# Patient Record
Sex: MALE | Race: WHITE | Employment: FULL TIME | URBAN - METROPOLITAN AREA
[De-identification: names, ages, dates, MRNs, and addresses within clinical notes are randomized per-mention and may not be internally consistent; named-entity substitution may affect disease eponyms.]

---

## 2019-01-22 ENCOUNTER — HOSPITAL ENCOUNTER (OUTPATIENT)
Dept: BONE DENSITY | Age: 59
Discharge: HOME | End: 2019-01-22
Attending: INTERNAL MEDICINE
Payer: COMMERCIAL

## 2019-01-22 DIAGNOSIS — M81.0 OSTEOPOROSIS: ICD-10-CM

## 2019-01-22 PROCEDURE — 77080 DXA BONE DENSITY AXIAL: CPT

## 2024-02-01 ENCOUNTER — APPOINTMENT (OUTPATIENT)
Dept: URGENT CARE | Age: 64
End: 2024-02-01

## 2024-03-13 DIAGNOSIS — Z00.6 ENCOUNTER FOR EXAMINATION FOR NORMAL COMPARISON OR CONTROL IN CLINICAL RESEARCH PROGRAM: ICD-10-CM

## 2024-05-15 ENCOUNTER — OFFICE VISIT (OUTPATIENT)
Dept: FAMILY MEDICINE CLINIC | Facility: CLINIC | Age: 64
End: 2024-05-15
Payer: COMMERCIAL

## 2024-05-15 VITALS
TEMPERATURE: 97.6 F | OXYGEN SATURATION: 98 % | HEART RATE: 59 BPM | SYSTOLIC BLOOD PRESSURE: 116 MMHG | WEIGHT: 162.2 LBS | RESPIRATION RATE: 16 BRPM | DIASTOLIC BLOOD PRESSURE: 76 MMHG | HEIGHT: 70 IN | BODY MASS INDEX: 23.22 KG/M2

## 2024-05-15 DIAGNOSIS — Z12.5 PROSTATE CANCER SCREENING: ICD-10-CM

## 2024-05-15 DIAGNOSIS — Z00.00 ANNUAL PHYSICAL EXAM: ICD-10-CM

## 2024-05-15 DIAGNOSIS — Z13.220 LIPID SCREENING: ICD-10-CM

## 2024-05-15 DIAGNOSIS — Z11.59 NEED FOR HEPATITIS C SCREENING TEST: ICD-10-CM

## 2024-05-15 DIAGNOSIS — Z13.29 THYROID DISORDER SCREENING: ICD-10-CM

## 2024-05-15 DIAGNOSIS — Z13.1 SCREENING FOR DIABETES MELLITUS: ICD-10-CM

## 2024-05-15 DIAGNOSIS — M81.0 AGE-RELATED OSTEOPOROSIS WITHOUT CURRENT PATHOLOGICAL FRACTURE: ICD-10-CM

## 2024-05-15 DIAGNOSIS — R79.89 LOW TESTOSTERONE IN MALE: ICD-10-CM

## 2024-05-15 DIAGNOSIS — Z76.89 ENCOUNTER TO ESTABLISH CARE WITH NEW DOCTOR: Primary | ICD-10-CM

## 2024-05-15 PROCEDURE — 99204 OFFICE O/P NEW MOD 45 MIN: CPT | Performed by: INTERNAL MEDICINE

## 2024-05-15 RX ORDER — FINASTERIDE 1 MG/1
TABLET, FILM COATED ORAL
COMMUNITY
Start: 2024-04-25

## 2024-05-15 RX ORDER — TESTOSTERONE CYPIONATE 200 MG/ML
INJECTION, SOLUTION INTRAMUSCULAR
COMMUNITY
Start: 2024-03-27

## 2024-05-15 NOTE — PROGRESS NOTES
Assessment/Plan:           Problem List Items Addressed This Visit    None  Visit Diagnoses       Encounter to establish care with new doctor    -  Primary    Age-related osteoporosis without current pathological fracture        Relevant Orders    Vitamin D 25 hydroxy    Low testosterone in male        Relevant Orders    Prolactin    Testosterone, Free and Weakly Bound    Prostate cancer screening        Relevant Orders    PSA, Total Screen    Ambulatory Referral to Urology    Screening for diabetes mellitus        Relevant Orders    CBC and differential    Comprehensive metabolic panel    Urinalysis with microscopic    Thyroid disorder screening        Relevant Orders    TSH, 3rd generation with Free T4 reflex    Lipid screening        Relevant Orders    Lipid panel    Need for hepatitis C screening test                  Subjective:      Patient ID: Branden Connolly is a 63 y.o. male.    HPI  Pleasant 63-year-old gentleman who is here to establish care referred by his wife who recently moved from [place].  His past medical history significant for osteoporosis when he broke her collarbone was done 9 2 and being on Forteo and then Fosamax and currently is on Reclast once a year.  He will be due for his next injection.  He has an appointment to see endocrinology in August.  He will be getting his DEXA scan this year.  While he was going for workup of osteoporosis it was noted to have low testosterone levels as well.  Was placed on Depo testosterone injection once a week.  PSA will be ordered patient will be referred to urology.  There is family history of breast cancer in her mother and her sister.  He is not aware if BRCA gene was found.  He is interested in helix testing.  .  He recalls and shares an episode of orchitis when he was young.  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  Reviewing records available in the chart patient had tetanus vaccination 2017.   He is  "up-to-date with shingles vaccination.   Did not get pneumonia vaccination.  Patient had colonoscopy 2021 and was asked to come back in 10 years    The following portions of the patient's history were reviewed and updated as appropriate: allergies, current medications, past family history, past medical history, past social history, past surgical history, and problem list.    Review of Systems      Objective:      /76 (BP Location: Left arm, Patient Position: Sitting, Cuff Size: Standard)   Pulse 59   Temp 97.6 °F (36.4 °C) (Tympanic)   Resp 16   Ht 5' 10\" (1.778 m)   Wt 73.6 kg (162 lb 3.2 oz)   SpO2 98%   BMI 23.27 kg/m²          Physical Exam  Neck:      Vascular: No carotid bruit.   Cardiovascular:      Rate and Rhythm: Normal rate and regular rhythm.      Pulses: Normal pulses.      Heart sounds: Normal heart sounds. No murmur heard.  Pulmonary:      Effort: Pulmonary effort is normal. No respiratory distress.      Breath sounds: Normal breath sounds. No wheezing or rales.   Abdominal:      General: Bowel sounds are normal. There is no distension.      Tenderness: There is no abdominal tenderness. There is no guarding.   Musculoskeletal:      Right lower leg: No edema.      Left lower leg: No edema.   Lymphadenopathy:      Cervical: No cervical adenopathy.      Upper Body:      Right upper body: No supraclavicular or axillary adenopathy.      Left upper body: No supraclavicular or axillary adenopathy.   Neurological:      Mental Status: He is alert.   Psychiatric:         Mood and Affect: Mood normal.         Behavior: Behavior normal.               Depression Screening and Follow-up Plan: Patient was screened for depression during today's encounter. They screened negative with a PHQ-2 score of 0.       "

## 2024-05-18 ENCOUNTER — APPOINTMENT (OUTPATIENT)
Dept: LAB | Age: 64
End: 2024-05-18
Payer: COMMERCIAL

## 2024-05-18 ENCOUNTER — LAB (OUTPATIENT)
Dept: LAB | Age: 64
End: 2024-05-18
Payer: COMMERCIAL

## 2024-05-18 DIAGNOSIS — Z00.6 ENCOUNTER FOR EXAMINATION FOR NORMAL COMPARISON OR CONTROL IN CLINICAL RESEARCH PROGRAM: ICD-10-CM

## 2024-05-18 DIAGNOSIS — Z13.1 SCREENING FOR DIABETES MELLITUS: ICD-10-CM

## 2024-05-18 DIAGNOSIS — Z00.8 HEALTH EXAMINATION IN POPULATION SURVEY: ICD-10-CM

## 2024-05-18 DIAGNOSIS — R79.89 LOW TESTOSTERONE IN MALE: ICD-10-CM

## 2024-05-18 LAB
25(OH)D3 SERPL-MCNC: 47.8 NG/ML (ref 30–100)
ALBUMIN SERPL BCP-MCNC: 4.4 G/DL (ref 3.5–5)
ALP SERPL-CCNC: 32 U/L (ref 34–104)
ALT SERPL W P-5'-P-CCNC: 18 U/L (ref 7–52)
ANION GAP SERPL CALCULATED.3IONS-SCNC: 8 MMOL/L (ref 4–13)
AST SERPL W P-5'-P-CCNC: 20 U/L (ref 13–39)
BACTERIA UR QL AUTO: NORMAL /HPF
BASOPHILS # BLD AUTO: 0.03 THOUSANDS/ÂΜL (ref 0–0.1)
BASOPHILS NFR BLD AUTO: 1 % (ref 0–1)
BILIRUB SERPL-MCNC: 1.67 MG/DL (ref 0.2–1)
BILIRUB UR QL STRIP: NEGATIVE
BUN SERPL-MCNC: 15 MG/DL (ref 5–25)
CALCIUM SERPL-MCNC: 8.8 MG/DL (ref 8.4–10.2)
CHLORIDE SERPL-SCNC: 105 MMOL/L (ref 96–108)
CHOLEST SERPL-MCNC: 177 MG/DL
CLARITY UR: CLEAR
CO2 SERPL-SCNC: 28 MMOL/L (ref 21–32)
COLOR UR: COLORLESS
CREAT SERPL-MCNC: 0.95 MG/DL (ref 0.6–1.3)
EOSINOPHIL # BLD AUTO: 0.09 THOUSAND/ÂΜL (ref 0–0.61)
EOSINOPHIL NFR BLD AUTO: 2 % (ref 0–6)
ERYTHROCYTE [DISTWIDTH] IN BLOOD BY AUTOMATED COUNT: 12.7 % (ref 11.6–15.1)
EST. AVERAGE GLUCOSE BLD GHB EST-MCNC: 111 MG/DL
GFR SERPL CREATININE-BSD FRML MDRD: 84 ML/MIN/1.73SQ M
GLUCOSE P FAST SERPL-MCNC: 89 MG/DL (ref 65–99)
GLUCOSE UR STRIP-MCNC: NEGATIVE MG/DL
HBA1C MFR BLD: 5.5 %
HCT VFR BLD AUTO: 46.6 % (ref 36.5–49.3)
HDLC SERPL-MCNC: 62 MG/DL
HGB BLD-MCNC: 15.2 G/DL (ref 12–17)
HGB UR QL STRIP.AUTO: NEGATIVE
IMM GRANULOCYTES # BLD AUTO: 0 THOUSAND/UL (ref 0–0.2)
IMM GRANULOCYTES NFR BLD AUTO: 0 % (ref 0–2)
KETONES UR STRIP-MCNC: NEGATIVE MG/DL
LDLC SERPL CALC-MCNC: 103 MG/DL (ref 0–100)
LEUKOCYTE ESTERASE UR QL STRIP: NEGATIVE
LYMPHOCYTES # BLD AUTO: 1.55 THOUSANDS/ÂΜL (ref 0.6–4.47)
LYMPHOCYTES NFR BLD AUTO: 36 % (ref 14–44)
MCH RBC QN AUTO: 30.6 PG (ref 26.8–34.3)
MCHC RBC AUTO-ENTMCNC: 32.6 G/DL (ref 31.4–37.4)
MCV RBC AUTO: 94 FL (ref 82–98)
MONOCYTES # BLD AUTO: 0.34 THOUSAND/ÂΜL (ref 0.17–1.22)
MONOCYTES NFR BLD AUTO: 8 % (ref 4–12)
NEUTROPHILS # BLD AUTO: 2.33 THOUSANDS/ÂΜL (ref 1.85–7.62)
NEUTS SEG NFR BLD AUTO: 53 % (ref 43–75)
NITRITE UR QL STRIP: NEGATIVE
NON-SQ EPI CELLS URNS QL MICRO: NORMAL /HPF
NONHDLC SERPL-MCNC: 115 MG/DL
NRBC BLD AUTO-RTO: 0 /100 WBCS
PH UR STRIP.AUTO: 6.5 [PH]
PLATELET # BLD AUTO: 222 THOUSANDS/UL (ref 149–390)
PMV BLD AUTO: 10.2 FL (ref 8.9–12.7)
POTASSIUM SERPL-SCNC: 3.9 MMOL/L (ref 3.5–5.3)
PROLACTIN SERPL-MCNC: 9.73 NG/ML
PROT SERPL-MCNC: 6.8 G/DL (ref 6.4–8.4)
PROT UR STRIP-MCNC: NEGATIVE MG/DL
PSA SERPL-MCNC: 0.33 NG/ML (ref 0–4)
RBC # BLD AUTO: 4.96 MILLION/UL (ref 3.88–5.62)
RBC #/AREA URNS AUTO: NORMAL /HPF
SODIUM SERPL-SCNC: 141 MMOL/L (ref 135–147)
SP GR UR STRIP.AUTO: 1.01 (ref 1–1.03)
TRIGL SERPL-MCNC: 60 MG/DL
TSH SERPL DL<=0.05 MIU/L-ACNC: 2.61 UIU/ML (ref 0.45–4.5)
UROBILINOGEN UR STRIP-ACNC: <2 MG/DL
WBC # BLD AUTO: 4.34 THOUSAND/UL (ref 4.31–10.16)
WBC #/AREA URNS AUTO: NORMAL /HPF

## 2024-05-18 PROCEDURE — 84410 TESTOSTERONE BIOAVAILABLE: CPT | Performed by: INTERNAL MEDICINE

## 2024-05-18 PROCEDURE — G0103 PSA SCREENING: HCPCS | Performed by: INTERNAL MEDICINE

## 2024-05-18 PROCEDURE — 80053 COMPREHEN METABOLIC PANEL: CPT | Performed by: INTERNAL MEDICINE

## 2024-05-18 PROCEDURE — 84146 ASSAY OF PROLACTIN: CPT

## 2024-05-18 PROCEDURE — 85025 COMPLETE CBC W/AUTO DIFF WBC: CPT | Performed by: INTERNAL MEDICINE

## 2024-05-18 PROCEDURE — 83036 HEMOGLOBIN GLYCOSYLATED A1C: CPT

## 2024-05-18 PROCEDURE — 36415 COLL VENOUS BLD VENIPUNCTURE: CPT

## 2024-05-18 PROCEDURE — 81001 URINALYSIS AUTO W/SCOPE: CPT

## 2024-05-18 PROCEDURE — 36415 COLL VENOUS BLD VENIPUNCTURE: CPT | Performed by: INTERNAL MEDICINE

## 2024-05-18 PROCEDURE — 80061 LIPID PANEL: CPT | Performed by: INTERNAL MEDICINE

## 2024-05-18 PROCEDURE — 84443 ASSAY THYROID STIM HORMONE: CPT | Performed by: INTERNAL MEDICINE

## 2024-05-18 PROCEDURE — 82306 VITAMIN D 25 HYDROXY: CPT | Performed by: INTERNAL MEDICINE

## 2024-05-21 LAB
DEPRECATED TESTOST FREE FR SERPL: 96 NG/DL (ref 40–250)
TESTOST SERPL-MCNC: 1091 NG/DL (ref 264–916)
TESTOSTERONE.FREE+WB MFR SERPL: 8.8 % (ref 9–46)

## 2024-05-23 ENCOUNTER — TELEPHONE (OUTPATIENT)
Dept: FAMILY MEDICINE CLINIC | Facility: CLINIC | Age: 64
End: 2024-05-23

## 2024-05-23 NOTE — TELEPHONE ENCOUNTER
----- Message from Sita Rivera MD sent at 5/22/2024  4:29 PM EDT -----  Patient on testosterone supplementation.  He can discuss this further with his urologist

## 2024-06-11 ENCOUNTER — TELEPHONE (OUTPATIENT)
Dept: FAMILY MEDICINE CLINIC | Facility: CLINIC | Age: 64
End: 2024-06-11

## 2024-07-10 LAB
APOB+LDLR+PCSK9 GENE MUT ANL BLD/T: NOT DETECTED
BRCA1+BRCA2 DEL+DUP + FULL MUT ANL BLD/T: NOT DETECTED
MLH1+MSH2+MSH6+PMS2 GN DEL+DUP+FUL M: NOT DETECTED

## 2024-07-26 ENCOUNTER — TELEPHONE (OUTPATIENT)
Dept: ENDOCRINOLOGY | Facility: CLINIC | Age: 64
End: 2024-07-26

## 2024-07-26 ENCOUNTER — TELEPHONE (OUTPATIENT)
Age: 64
End: 2024-07-26

## 2024-07-26 NOTE — TELEPHONE ENCOUNTER
Patient is requesting an referral for the following specialty:      Test Name / Order Name: endocrinologist    DX Code: M81.0    Date Of Service: 8/6/2024    Location/Facility Name/Address/Phone #:     Location / Facility NPI: [id number]    Best Phone # To Reach The Patient:

## 2024-07-26 NOTE — TELEPHONE ENCOUNTER
Called Dr Sita Rivera's office and spoke to Argelia that Pt will need a refererral for aug 6th office visit and Argelia said she will send to the referral team

## 2024-08-01 NOTE — TELEPHONE ENCOUNTER
Pt called unsure why Dr. Rivera needed to do a referral.  He had scheduled the appt before starting with Dr. Rivera as recently moved to the area from [place].    While in [place] he was seeing Dr. Mabel Flores MD - endocrinologist.  Dr phone [phone]     He had schedule with endocrinology appt to get get establish and did not know he needed a referral from Dr. Rivera.    If any further information is needed please contact pt [phone]    Pt does have a f/u appt scheduled for September.

## 2024-08-02 ENCOUNTER — TELEPHONE (OUTPATIENT)
Dept: UROLOGY | Facility: AMBULATORY SURGERY CENTER | Age: 64
End: 2024-08-02

## 2024-08-02 DIAGNOSIS — R79.89 LOW TESTOSTERONE IN MALE: Primary | ICD-10-CM

## 2024-08-02 DIAGNOSIS — M81.0 OSTEOPOROSIS, UNSPECIFIED OSTEOPOROSIS TYPE, UNSPECIFIED PATHOLOGICAL FRACTURE PRESENCE: ICD-10-CM

## 2024-08-02 NOTE — TELEPHONE ENCOUNTER
Called pt and left message for him to call our office giving a reason why he needs a endo referral

## 2024-08-02 NOTE — TELEPHONE ENCOUNTER
LVM to let PT know that his apt with Dr. Vaughn on 8/16 needs to be rescheduled.    Please reschedule NP apt. With an AP in the [place] office.
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
## 2024-08-06 ENCOUNTER — TELEPHONE (OUTPATIENT)
Dept: ENDOCRINOLOGY | Facility: CLINIC | Age: 64
End: 2024-08-06

## 2024-08-06 ENCOUNTER — OFFICE VISIT (OUTPATIENT)
Dept: ENDOCRINOLOGY | Facility: CLINIC | Age: 64
End: 2024-08-06
Payer: COMMERCIAL

## 2024-08-06 VITALS
HEART RATE: 65 BPM | BODY MASS INDEX: 22.85 KG/M2 | HEIGHT: 70 IN | SYSTOLIC BLOOD PRESSURE: 120 MMHG | DIASTOLIC BLOOD PRESSURE: 64 MMHG | WEIGHT: 159.6 LBS | OXYGEN SATURATION: 96 %

## 2024-08-06 DIAGNOSIS — M81.0 OSTEOPOROSIS, UNSPECIFIED OSTEOPOROSIS TYPE, UNSPECIFIED PATHOLOGICAL FRACTURE PRESENCE: Primary | ICD-10-CM

## 2024-08-06 DIAGNOSIS — E29.1 HYPOGONADISM, MALE: ICD-10-CM

## 2024-08-06 DIAGNOSIS — R79.89 LOW TESTOSTERONE IN MALE: ICD-10-CM

## 2024-08-06 PROCEDURE — 99244 OFF/OP CNSLTJ NEW/EST MOD 40: CPT | Performed by: INTERNAL MEDICINE

## 2024-08-06 RX ORDER — TESTOSTERONE CYPIONATE 200 MG/ML
INJECTION, SOLUTION INTRAMUSCULAR
Qty: 10 ML | Refills: 1 | Status: SHIPPED | OUTPATIENT
Start: 2024-08-06

## 2024-08-06 RX ORDER — TESTOSTERONE CYPIONATE 200 MG/ML
INJECTION, SOLUTION INTRAMUSCULAR
Start: 2024-08-06 | End: 2024-08-06 | Stop reason: SDUPTHER

## 2024-08-06 RX ORDER — NEEDLES, SAFETY 18GX1 1/2"
NEEDLE, DISPOSABLE MISCELLANEOUS
COMMUNITY
Start: 2024-08-02

## 2024-08-06 NOTE — PATIENT INSTRUCTIONS
We need records from Dr. Flores!     Get labs, on day 3 after your injection    -if Saturday , do Wednesday     Get DXA

## 2024-08-06 NOTE — ASSESSMENT & PLAN NOTE
Patient was diagnosed and worked up with his last endocrinologist in [place]   Patient did ask for records but we have not received, history is limited to patient recall  Have had patient fill out official record request form  Overall, it does appear that he was osteoporotic on diagnosis 2016, treated with 1.5y Forteo with improvement to osteopenia, followed by almost 2 years of Fosamax and 1 Reclast infusion.     Have asked patient to update DXA scan  Will obtain labs now including CTX, NTX, iron panel, PTH, calcium, albumin, phosporus, B6, as well as screen for celiac and Multiple Myeloma   Pending above results, would consider resuming Reclast vs bisphosphonate holiday

## 2024-08-06 NOTE — ASSESSMENT & PLAN NOTE
Again, we require records from his past endocrinologist   Appears that there may have been a childhood orchitis, followed by infertility and low sperm counts  Appears he was primarily diagnosed with high SHBG and low free testosterone, and has not had many traditional hypogonadal symptoms    Will continue current testosterone regimen of 80mg weekly (0.4mL of 200mg/mL) for now  Will obtain testosterone levels on post injection day 3, as well as LH, FSH, TSH, fT4, SHBG

## 2024-08-07 ENCOUNTER — TELEPHONE (OUTPATIENT)
Age: 64
End: 2024-08-07

## 2024-08-07 NOTE — TELEPHONE ENCOUNTER
PA for testosterone cypionate (DEPO-TESTOSTERONE) 200 mg/mL SOLN  Approved     Date(s) approved approval will  2025     Case #     Patient advised by          [x] [employer] Message  [] Phone call   []LMOM  []L/M to call office as no active Communication consent on file  []Unable to leave detailed message as VM not approved on Communication consent       Pharmacy advised by    [x]Fax  []Phone call    Approval letter scanned into Media Yes

## 2024-08-08 ENCOUNTER — PATIENT MESSAGE (OUTPATIENT)
Dept: ENDOCRINOLOGY | Facility: CLINIC | Age: 64
End: 2024-08-08

## 2024-08-13 ENCOUNTER — APPOINTMENT (OUTPATIENT)
Dept: LAB | Age: 64
End: 2024-08-13
Payer: COMMERCIAL

## 2024-08-13 DIAGNOSIS — R79.89 LOW TESTOSTERONE IN MALE: ICD-10-CM

## 2024-08-13 DIAGNOSIS — M81.0 OSTEOPOROSIS, UNSPECIFIED OSTEOPOROSIS TYPE, UNSPECIFIED PATHOLOGICAL FRACTURE PRESENCE: ICD-10-CM

## 2024-08-13 LAB
ALBUMIN SERPL BCG-MCNC: 4.6 G/DL (ref 3.5–5)
CALCIUM SERPL-MCNC: 9.8 MG/DL (ref 8.4–10.2)
FERRITIN SERPL-MCNC: 56 NG/ML (ref 24–336)
FSH SERPL-ACNC: 0.2 MIU/ML
IGA SERPL-MCNC: 104 MG/DL (ref 66–433)
IRON SATN MFR SERPL: 14 % (ref 15–50)
IRON SERPL-MCNC: 50 UG/DL (ref 50–212)
LH SERPL-ACNC: <0.2 MIU/ML
PHOSPHATE SERPL-MCNC: 3.7 MG/DL (ref 2.3–4.1)
PTH-INTACT SERPL-MCNC: 40.6 PG/ML (ref 12–88)
T4 FREE SERPL-MCNC: 0.76 NG/DL (ref 0.61–1.12)
TIBC SERPL-MCNC: 368 UG/DL (ref 250–450)
TSH SERPL DL<=0.05 MIU/L-ACNC: 1.43 UIU/ML (ref 0.45–4.5)
UIBC SERPL-MCNC: 318 UG/DL (ref 155–355)

## 2024-08-13 PROCEDURE — 84100 ASSAY OF PHOSPHORUS: CPT

## 2024-08-13 PROCEDURE — 84443 ASSAY THYROID STIM HORMONE: CPT

## 2024-08-13 PROCEDURE — 86364 TISS TRNSGLTMNASE EA IG CLAS: CPT

## 2024-08-13 PROCEDURE — 84270 ASSAY OF SEX HORMONE GLOBUL: CPT

## 2024-08-13 PROCEDURE — 36415 COLL VENOUS BLD VENIPUNCTURE: CPT

## 2024-08-13 PROCEDURE — 83970 ASSAY OF PARATHORMONE: CPT

## 2024-08-13 PROCEDURE — 83001 ASSAY OF GONADOTROPIN (FSH): CPT

## 2024-08-13 PROCEDURE — 83550 IRON BINDING TEST: CPT

## 2024-08-13 PROCEDURE — 83002 ASSAY OF GONADOTROPIN (LH): CPT

## 2024-08-13 PROCEDURE — 82523 COLLAGEN CROSSLINKS: CPT

## 2024-08-13 PROCEDURE — 82040 ASSAY OF SERUM ALBUMIN: CPT

## 2024-08-13 PROCEDURE — 82728 ASSAY OF FERRITIN: CPT

## 2024-08-13 PROCEDURE — 86258 DGP ANTIBODY EACH IG CLASS: CPT

## 2024-08-13 PROCEDURE — 84439 ASSAY OF FREE THYROXINE: CPT

## 2024-08-13 PROCEDURE — 84166 PROTEIN E-PHORESIS/URINE/CSF: CPT

## 2024-08-13 PROCEDURE — 84402 ASSAY OF FREE TESTOSTERONE: CPT

## 2024-08-13 PROCEDURE — 84207 ASSAY OF VITAMIN B-6: CPT

## 2024-08-13 PROCEDURE — 82784 ASSAY IGA/IGD/IGG/IGM EACH: CPT

## 2024-08-13 PROCEDURE — 84165 PROTEIN E-PHORESIS SERUM: CPT

## 2024-08-13 PROCEDURE — 83540 ASSAY OF IRON: CPT

## 2024-08-14 LAB
GLIADIN PEPTIDE IGA SER-ACNC: <0.2 U/ML
GLIADIN PEPTIDE IGA SER-ACNC: NEGATIVE
GLIADIN PEPTIDE IGG SER-ACNC: <0.4 U/ML
GLIADIN PEPTIDE IGG SER-ACNC: NEGATIVE
TTG IGA SER-ACNC: <0.5 U/ML
TTG IGA SER-ACNC: NEGATIVE
TTG IGG SER-ACNC: <0.8 U/ML
TTG IGG SER-ACNC: NEGATIVE

## 2024-08-15 LAB
ALBUMIN SERPL ELPH-MCNC: 4.5 G/DL (ref 3.2–5.1)
ALBUMIN SERPL ELPH-MCNC: 64.3 % (ref 48–70)
ALPHA1 GLOB SERPL ELPH-MCNC: 0.23 G/DL (ref 0.15–0.47)
ALPHA1 GLOB SERPL ELPH-MCNC: 3.3 % (ref 1.8–7)
ALPHA2 GLOB SERPL ELPH-MCNC: 0.57 G/DL (ref 0.42–1.04)
ALPHA2 GLOB SERPL ELPH-MCNC: 8.1 % (ref 5.9–14.9)
BETA GLOB ABNORMAL SERPL ELPH-MCNC: 0.39 G/DL (ref 0.31–0.57)
BETA1 GLOB SERPL ELPH-MCNC: 5.5 % (ref 4.7–7.7)
BETA2 GLOB SERPL ELPH-MCNC: 4 % (ref 3.1–7.9)
BETA2+GAMMA GLOB SERPL ELPH-MCNC: 0.28 G/DL (ref 0.2–0.58)
GAMMA GLOB ABNORMAL SERPL ELPH-MCNC: 1.04 G/DL (ref 0.4–1.66)
GAMMA GLOB SERPL ELPH-MCNC: 14.8 % (ref 6.9–22.3)
IGG/ALB SER: 1.8 {RATIO} (ref 1.1–1.8)
PROT PATTERN SERPL ELPH-IMP: NORMAL
PROT SERPL-MCNC: 7 G/DL (ref 6.4–8.2)
SHBG SERPL-SCNC: 87.6 NMOL/L (ref 19.3–76.4)

## 2024-08-15 PROCEDURE — 84165 PROTEIN E-PHORESIS SERUM: CPT | Performed by: STUDENT IN AN ORGANIZED HEALTH CARE EDUCATION/TRAINING PROGRAM

## 2024-08-16 LAB
ALBUMIN UR ELPH-MCNC: 16.3 %
ALPHA1 GLOB MFR UR ELPH: 9.6 %
ALPHA2 GLOB MFR UR ELPH: 5.1 %
B-GLOBULIN MFR UR ELPH: 4.7 %
GAMMA GLOB MFR UR ELPH: 64.3 %
PROT PATTERN UR ELPH-IMP: NORMAL
PROT UR-MCNC: 4 MG/DL

## 2024-08-16 PROCEDURE — 84165 PROTEIN E-PHORESIS SERUM: CPT | Performed by: STUDENT IN AN ORGANIZED HEALTH CARE EDUCATION/TRAINING PROGRAM

## 2024-08-19 ENCOUNTER — APPOINTMENT (OUTPATIENT)
Dept: LAB | Age: 64
End: 2024-08-19
Payer: COMMERCIAL

## 2024-08-19 PROCEDURE — 82523 COLLAGEN CROSSLINKS: CPT

## 2024-08-19 PROCEDURE — 82570 ASSAY OF URINE CREATININE: CPT

## 2024-08-22 LAB
COLLAGEN NTX UR-SCNC: 44 NMOL BCE
COLLAGEN NTX/CREAT UR-SRTO: 10 NM BCE/MM CR (ref 0–62)
CREAT UR-MCNC: 48 MG/DL
INTERPRETIVE GUIDE:: NORMAL
MISCELLANEOUS LAB TEST RESULT: NORMAL
VIT B6 SERPL-MCNC: 49.5 UG/L (ref 3.4–65.2)

## 2024-08-23 LAB — COLLAGEN CTX SERPL-MCNC: 141 PG/ML

## 2024-09-27 ENCOUNTER — OFFICE VISIT (OUTPATIENT)
Age: 64
End: 2024-09-27
Payer: COMMERCIAL

## 2024-09-27 VITALS
DIASTOLIC BLOOD PRESSURE: 70 MMHG | SYSTOLIC BLOOD PRESSURE: 106 MMHG | HEART RATE: 60 BPM | BODY MASS INDEX: 23.34 KG/M2 | WEIGHT: 163 LBS | HEIGHT: 70 IN | OXYGEN SATURATION: 98 % | TEMPERATURE: 98.3 F

## 2024-09-27 DIAGNOSIS — Z00.00 ANNUAL PHYSICAL EXAM: Primary | ICD-10-CM

## 2024-09-27 DIAGNOSIS — Z13.29 THYROID DISORDER SCREENING: ICD-10-CM

## 2024-09-27 DIAGNOSIS — Z13.220 LIPID SCREENING: ICD-10-CM

## 2024-09-27 DIAGNOSIS — Z13.1 SCREENING FOR DIABETES MELLITUS: ICD-10-CM

## 2024-09-27 PROCEDURE — 99396 PREV VISIT EST AGE 40-64: CPT | Performed by: INTERNAL MEDICINE

## 2024-09-27 RX ORDER — NEEDLES, DISPOSABLE 25GX5/8"
NEEDLE, DISPOSABLE MISCELLANEOUS
COMMUNITY
Start: 2024-08-06

## 2024-09-27 NOTE — PATIENT INSTRUCTIONS
"Patient Education     Routine physical for adults   The Basics   Written by the doctors and editors at [facility]   What is a physical? -- A physical is a routine visit, or \"check-up,\" with your doctor. You might also hear it called a \"wellness visit\" or \"preventive visit.\"  During each visit, the doctor will:   Ask about your physical and mental health   Ask about your habits, behaviors, and lifestyle   Do an exam   Give you vaccines if needed   Talk to you about any medicines you take   Give advice about your health   Answer your questions  Getting regular check-ups is an important part of taking care of your health. It can help your doctor find and treat any problems you have. But it's also important for preventing health problems.  A routine physical is different from a \"sick visit.\" A sick visit is when you see a doctor because of a health concern or problem. Since physicals are scheduled ahead of time, you can think about what you want to ask the doctor.  How often should I get a physical? -- It depends on your age and health. In general, for people age 21 years and older:   If you are younger than 50 years, you might be able to get a physical every 3 years.   If you are 50 years or older, your doctor might recommend a physical every year.  If you have an ongoing health condition, like diabetes or high blood pressure, your doctor will probably want to see you more often.  What happens during a physical? -- In general, each visit will include:   Physical exam - The doctor or nurse will check your height, weight, heart rate, and blood pressure. They will also look at your eyes and ears. They will ask about how you are feeling and whether you have any symptoms that bother you.   Medicines - It's a good idea to bring a list of all the medicines you take to each doctor visit. Your doctor will talk to you about your medicines and answer any questions. Tell them if you are having any side effects that bother you. You " "should also tell them if you are having trouble paying for any of your medicines.   Habits and behaviors - This includes:   Your diet   Your exercise habits   Whether you smoke, drink alcohol, or use drugs   Whether you are sexually active   Whether you feel safe at home  Your doctor will talk to you about things you can do to improve your health and lower your risk of health problems. They will also offer help and support. For example, if you want to quit smoking, they can give you advice and might prescribe medicines. If you want to improve your diet or get more physical activity, they can help you with this, too.   Lab tests, if needed - The tests you get will depend on your age and situation. For example, your doctor might want to check your:   Cholesterol   Blood sugar   Iron level   Vaccines - The recommended vaccines will depend on your age, health, and what vaccines you already had. Vaccines are very important because they can prevent certain serious or deadly infections.   Discussion of screening - \"Screening\" means checking for diseases or other health problems before they cause symptoms. Your doctor can recommend screening based on your age, risk, and preferences. This might include tests to check for:   Cancer, such as breast, prostate, cervical, ovarian, colorectal, prostate, lung, or skin cancer   Sexually transmitted infections, such as chlamydia and gonorrhea   Mental health conditions like depression and anxiety  Your doctor will talk to you about the different types of screening tests. They can help you decide which screenings to have. They can also explain what the results might mean.   Answering questions - The physical is a good time to ask the doctor or nurse questions about your health. If needed, they can refer you to other doctors or specialists, too.  Adults older than 65 years often need other care, too. As you get older, your doctor will talk to you about:   How to prevent falling at " home   Hearing or vision tests   Memory testing   How to take your medicines safely   Making sure that you have the help and support you need at home  All topics are updated as new evidence becomes available and our peer review process is complete.  This topic retrieved from [employer] on: May 02, 2024.  Topic 761826 Version 1.0  Release: 32.4.3 - C32.122  © 2024 UpToDate, Inc. and/or its affiliates. All rights reserved.  Consumer Information Use and Disclaimer   Disclaimer: This generalized information is a limited summary of diagnosis, treatment, and/or medication information. It is not meant to be comprehensive and should be used as a tool to help the user understand and/or assess potential diagnostic and treatment options. It does NOT include all information about conditions, treatments, medications, side effects, or risks that may apply to a specific patient. It is not intended to be medical advice or a substitute for the medical advice, diagnosis, or treatment of a health care provider based on the health care provider's examination and assessment of a patient's specific and unique circumstances. Patients must speak with a health care provider for complete information about their health, medical questions, and treatment options, including any risks or benefits regarding use of medications. This information does not endorse any treatments or medications as safe, effective, or approved for treating a specific patient. UpToDate, Inc. and its affiliates disclaim any warranty or liability relating to this information or the use thereof.The use of this information is governed by the Terms of Use, available at https://www.woltersRendeevoouwer.com/en/know/clinical-effectiveness-terms. 2024© UpToDate, Inc. and its affiliates and/or licensors. All rights reserved.  Copyright   © 2024 UpToDate, Inc. and/or its affiliates. All rights reserved.

## 2024-09-27 NOTE — PROGRESS NOTES
Adult Annual Physical  Name: Branden Connolly      : 1960      MRN: [id number]  Encounter Provider: Sita Rivera MD  Encounter Date: 2024   Encounter department: [facility]    Assessment & Plan  Annual physical exam         Screening for diabetes mellitus    Orders:    CBC and differential    Comprehensive metabolic panel    Hemoglobin A1C    Thyroid disorder screening    Orders:    TSH, 3rd generation with Free T4 reflex    Lipid screening    Orders:    Lipid panel      Immunizations and preventive care screenings were discussed with patient today. Appropriate education was printed on patient's after visit summary.        Counseling:  See below  Patient is here for annual physical.  Blood pressure is good today.  He is under care of endocrinology for hypogonadism and osteoporosis.  He has been on testosterone injections 80 mg once a week.  He will be following up with urology for prostate cancer screening.  His last lab studies were reviewed.  Follow-up blood work in May.  Colonoscopy  was asked to come back in 10 years.  Vaccinations discussed.  Patient will get Prevnar 20 in December when he turns 64.  Encouraged him to get flu COVID vaccination.    Depression Screening and Follow-up Plan: Patient was screened for depression during today's encounter. They screened negative with a PHQ-2 score of 0.        History of Present Illness     Adult Annual Physical:  Patient presents for annual physical.     Diet and Physical Activity:  - Diet/Nutrition: well balanced diet.  - Exercise: moderate cardiovascular exercise. Bikes regularly    Depression Screening:  - PHQ-2 Score: 0    General Health:  - Sleep:. Interrupted sleep.  Encouraged her to use melatonin over-the-counter  - Hearing: normal hearing bilateral ears.  - Vision: no vision problems.  - Dental: brushes teeth twice daily.    Advanced Care Planning:  - Has an advanced directive?: no    - Has a durable medical POA?: yes    -  "ACP document given to patient?: no      Review of Systems  Past Medical History   Past Medical History:   Diagnosis Date    Osteoporosis      Past Surgical History:   Procedure Laterality Date    APPENDECTOMY      TONSILLECTOMY  1968     Family History   Problem Relation Age of Onset    Breast cancer Mother     Dementia Father     Diabetes Sister     Breast cancer Sister      Current Outpatient Medications on File Prior to Visit   Medication Sig Dispense Refill    B-D HYPODERMIC NEEDLE 18GX1.5\" 18G X 1-1/2\" MISC       BD Eclipse Syringe 25G X 1\" 3 ML MISC       finasteride (PROPECIA) 1 MG tablet       testosterone cypionate (DEPO-TESTOSTERONE) 200 mg/mL SOLN 0.4 ml once weekly 10 mL 1     No current facility-administered medications on file prior to visit.     Allergies   Allergen Reactions    Naproxen Swelling    Penicillins Rash      Current Outpatient Medications on File Prior to Visit   Medication Sig Dispense Refill    B-D HYPODERMIC NEEDLE 18GX1.5\" 18G X 1-1/2\" MISC       BD Eclipse Syringe 25G X 1\" 3 ML MISC       finasteride (PROPECIA) 1 MG tablet       testosterone cypionate (DEPO-TESTOSTERONE) 200 mg/mL SOLN 0.4 ml once weekly 10 mL 1     No current facility-administered medications on file prior to visit.      Social History     Tobacco Use    Smoking status: Never    Smokeless tobacco: Never   Vaping Use    Vaping status: Never Used   Substance and Sexual Activity    Alcohol use: Yes    Drug use: Never    Sexual activity: Not on file       Objective     /70 (BP Location: Right arm, Patient Position: Sitting, Cuff Size: Standard)   Pulse 60   Temp 98.3 °F (36.8 °C) (Temporal)   Ht 5' 10\" (1.778 m)   Wt 73.9 kg (163 lb)   SpO2 98%   BMI 23.39 kg/m²     Physical Exam  Constitutional:       General: He is not in acute distress.     Appearance: He is well-developed.   HENT:      Head: Normocephalic.      Mouth/Throat:      Pharynx: No oropharyngeal exudate.   Eyes:      General: No scleral " icterus.     Conjunctiva/sclera: Conjunctivae normal.   Neck:      Thyroid: No thyromegaly.      Vascular: No carotid bruit.   Cardiovascular:      Rate and Rhythm: Normal rate and regular rhythm.      Heart sounds: Normal heart sounds. No murmur heard.  Pulmonary:      Effort: Pulmonary effort is normal. No respiratory distress.      Breath sounds: Normal breath sounds. No wheezing or rales.   Abdominal:      General: Bowel sounds are normal. There is no distension.      Palpations: Abdomen is soft. There is no mass.      Tenderness: There is no abdominal tenderness. There is no guarding.   Musculoskeletal:      Right lower leg: No edema.      Left lower leg: No edema.   Lymphadenopathy:      Cervical: No cervical adenopathy.   Skin:     General: Skin is warm.   Neurological:      Mental Status: He is alert and oriented to person, place, and time.      Cranial Nerves: No cranial nerve deficit.      Motor: No weakness.      Gait: Gait normal.      Deep Tendon Reflexes: Reflexes are normal and symmetric.   Psychiatric:         Behavior: Behavior normal.         Thought Content: Thought content normal.         Judgment: Judgment normal.       Administrative Statements   I have spent a total time of 30 minutes in caring for this patient on the day of the visit/encounter including Instructions for management, Patient and family education, Importance of tx compliance, Risk factor reductions, Impressions, Counseling / Coordination of care, Documenting in the medical record, Reviewing / ordering tests, medicine, procedures  , and Obtaining or reviewing history  .

## 2024-10-02 ENCOUNTER — TELEPHONE (OUTPATIENT)
Dept: UROLOGY | Facility: CLINIC | Age: 64
End: 2024-10-02

## 2024-10-02 NOTE — TELEPHONE ENCOUNTER
Message left for patient in regards to appointment with Dr. Vaughn at [place] on 10/7/24 at 2:45 being canceled due to a schedule change.  Jarredt has been rescheduled to [facility] at [place] on 10/18/24 7:40.  Asked patient to call back to confirm.  Eliciat message sent to patient as well.

## 2024-10-29 ENCOUNTER — OFFICE VISIT (OUTPATIENT)
Dept: UROLOGY | Facility: AMBULATORY SURGERY CENTER | Age: 64
End: 2024-10-29

## 2024-10-29 VITALS
OXYGEN SATURATION: 96 % | WEIGHT: 162 LBS | BODY MASS INDEX: 23.19 KG/M2 | DIASTOLIC BLOOD PRESSURE: 68 MMHG | HEART RATE: 68 BPM | HEIGHT: 70 IN | SYSTOLIC BLOOD PRESSURE: 102 MMHG

## 2024-10-29 DIAGNOSIS — Z12.5 SCREENING FOR PROSTATE CANCER: Primary | ICD-10-CM

## 2024-10-29 DIAGNOSIS — E29.1 HYPOGONADISM, MALE: ICD-10-CM

## 2024-10-29 NOTE — ASSESSMENT & PLAN NOTE
Lab Results   Component Value Date    PSA 0.33 05/18/2024   PSA is extremely low and stable.  He denies any strong family history of prostate cancer.  Due to his low PSA I would recommend having repeat PSA in 2026.  HAMIDA is not indicated based off of AUA guidelines.  All questions concerns were answered at today's office visit.  He should continue to follow-up with his PCP for every other year PSA.

## 2024-10-29 NOTE — PROGRESS NOTES
"  Assessment and plan:     Screening for prostate cancer  Lab Results   Component Value Date    PSA 0.33 05/18/2024   PSA is extremely low and stable.  He denies any strong family history of prostate cancer.  Due to his low PSA I would recommend having repeat PSA in 2026.  HAMIDA is not indicated based off of AUA guidelines.  All questions concerns were answered at today's office visit.  He should continue to follow-up with his PCP for every other year PSA.    Hypogonadism, male  Continue to follow with endocrinology       History of Present Illness     Branden Connolly is a 63 y.o. male who presents today to the office as a new patient for prostate cancer screening.  He was referred by his PCP.  His PSA is extremely low and stable.  He denies any personal history of prostate cancer.  He denies any family history of prostate cancer.  He denies any urinary/urological complaints.    He is currently on testosterone replacement therapy that is managed by endocrinology.  He states he has been on testosterone replacement for over 5 years.    Laboratory     Lab Results   Component Value Date    BUN 15 05/18/2024    CREATININE 0.95 05/18/2024       No components found for: \"GFR\"    Lab Results   Component Value Date    CALCIUM 9.8 08/13/2024    K 3.9 05/18/2024    CO2 28 05/18/2024     05/18/2024       Lab Results   Component Value Date    WBC 4.34 05/18/2024    HGB 15.2 05/18/2024    HCT 46.6 05/18/2024    MCV 94 05/18/2024     05/18/2024       Lab Results   Component Value Date    PSA 0.33 05/18/2024       No results found for this or any previous visit (from the past 1 hour(s)).    Review of Systems     Review of Systems   Constitutional:  Negative for chills and fever.   Respiratory: Negative.  Negative for cough and shortness of breath.    Cardiovascular: Negative.  Negative for chest pain.   Gastrointestinal: Negative.  Negative for abdominal distention, abdominal pain, nausea and vomiting.   Genitourinary:  " Negative for decreased urine volume, difficulty urinating, dysuria, flank pain, frequency, hematuria, penile discharge, penile pain, penile swelling, scrotal swelling, testicular pain and urgency.   Skin: Negative.  Negative for rash.   Neurological: Negative.    Hematological:  Negative for adenopathy. Does not bruise/bleed easily.       AUA SYMPTOM SCORE      Flowsheet Row Most Recent Value   AUA SYMPTOM SCORE    How often have you had a sensation of not emptying your bladder completely after you finished urinating? 1 (P)     How often have you had to urinate again less than two hours after you finished urinating? 2 (P)     How often have you found you stopped and started again several times when you urinate? 2 (P)     How often have you found it difficult to postpone urination? 0 (P)     How often have you had a weak urinary stream? 3 (P)     How often have you had to push or strain to begin urination? 1 (P)     How many times did you most typically get up to urinate from the time you went to bed at night until the time you got up in the morning? 1 (P)     Quality of Life: If you were to spend the rest of your life with your urinary condition just the way it is now, how would you feel about that? 2 (P)     AUA SYMPTOM SCORE 10 (P)                    Allergies     Allergies   Allergen Reactions    Ibuprofen Headache     Higher doses over 600 gives him a headache    Naproxen Swelling    Penicillins Rash       Physical Exam     Physical Exam  Vitals reviewed.   Constitutional:       Appearance: Normal appearance.   HENT:      Head: Normocephalic and atraumatic.   Eyes:      Pupils: Pupils are equal, round, and reactive to light.   Cardiovascular:      Rate and Rhythm: Normal rate.   Pulmonary:      Effort: Pulmonary effort is normal.   Musculoskeletal:      Cervical back: Normal range of motion.   Skin:     General: Skin is warm and dry.   Neurological:      General: No focal deficit present.      Mental Status: He  "is alert and oriented to person, place, and time.   Psychiatric:         Mood and Affect: Mood normal.         Behavior: Behavior normal.         Thought Content: Thought content normal.         Judgment: Judgment normal.         Vital Signs     Vitals:    10/29/24 1509   BP: 102/68   BP Location: Left arm   Patient Position: Sitting   Cuff Size: Adult   Pulse: 68   SpO2: 96%   Weight: 73.5 kg (162 lb)   Height: 5' 10\" (1.778 m)       Current Medications       Current Outpatient Medications:     B-D HYPODERMIC NEEDLE 18GX1.5\" 18G X 1-1/2\" MISC, , Disp: , Rfl:     BD Eclipse Syringe 25G X 1\" 3 ML MISC, , Disp: , Rfl:     finasteride (PROPECIA) 1 MG tablet, , Disp: , Rfl:     testosterone cypionate (DEPO-TESTOSTERONE) 200 mg/mL SOLN, 0.4 ml once weekly, Disp: 10 mL, Rfl: 1    Active Problems     Patient Active Problem List   Diagnosis    Osteoporosis    Low testosterone in male    Hypogonadism, male    Screening for prostate cancer       Past Medical History     Past Medical History:   Diagnosis Date    Osteoporosis        Surgical History     Past Surgical History:   Procedure Laterality Date    APPENDECTOMY      TONSILLECTOMY  1968       Family History     Family History   Problem Relation Age of Onset    Breast cancer Mother     Dementia Father     Diabetes Sister     Breast cancer Sister        Social History     Social History     Social History     Tobacco Use   Smoking Status Never   Smokeless Tobacco Never       Past Surgical History:   Procedure Laterality Date    APPENDECTOMY      TONSILLECTOMY  1968         The following portions of the patient's history were reviewed and updated as appropriate: allergies, current medications, past family history, past medical history, past social history, past surgical history and problem list    Please note :  Voice dictation software has been used to create this document.  There may be inadvertent transcription errors.    JUANCARLOS Edwards   "

## 2024-11-04 ENCOUNTER — TELEPHONE (OUTPATIENT)
Age: 64
End: 2024-11-04

## 2024-11-04 DIAGNOSIS — R79.89 LOW TESTOSTERONE IN MALE: Primary | ICD-10-CM

## 2024-11-04 RX ORDER — NEEDLES, DISPOSABLE 25GX5/8"
NEEDLE, DISPOSABLE MISCELLANEOUS
Qty: 50 EACH | Refills: 0 | Status: SHIPPED | OUTPATIENT
Start: 2024-11-04

## 2024-11-04 RX ORDER — NEEDLES, SAFETY 18GX1 1/2"
NEEDLE, DISPOSABLE MISCELLANEOUS
Qty: 50 EACH | Refills: 0 | Status: SHIPPED | OUTPATIENT
Start: 2024-11-04

## 2024-11-04 NOTE — TELEPHONE ENCOUNTER
Patient called he is out of  his medication  he needs a  refill for  his BD- Hypodermic Needle  18GX1.5 18 G X 1 1/2 Mis .  BD Eclipse  Syringe  25G X 1 3 ML Misc  [facility]

## 2024-11-28 PROBLEM — Z12.5 SCREENING FOR PROSTATE CANCER: Status: RESOLVED | Noted: 2024-10-29 | Resolved: 2024-11-28

## 2024-12-01 DIAGNOSIS — L65.9 HAIR LOSS: Primary | ICD-10-CM

## 2024-12-03 RX ORDER — FINASTERIDE 1 MG/1
1 TABLET, FILM COATED ORAL DAILY
Qty: 90 TABLET | Refills: 1 | Status: SHIPPED | OUTPATIENT
Start: 2024-12-03

## 2025-02-18 ENCOUNTER — OFFICE VISIT (OUTPATIENT)
Dept: ENDOCRINOLOGY | Facility: CLINIC | Age: 65
End: 2025-02-18
Payer: COMMERCIAL

## 2025-02-18 VITALS
HEIGHT: 70 IN | DIASTOLIC BLOOD PRESSURE: 76 MMHG | HEART RATE: 63 BPM | BODY MASS INDEX: 23.62 KG/M2 | WEIGHT: 165 LBS | OXYGEN SATURATION: 99 % | SYSTOLIC BLOOD PRESSURE: 112 MMHG

## 2025-02-18 DIAGNOSIS — E29.1 HYPOGONADISM, MALE: Primary | ICD-10-CM

## 2025-02-18 DIAGNOSIS — E55.9 VITAMIN D DEFICIENCY: ICD-10-CM

## 2025-02-18 DIAGNOSIS — M81.0 OSTEOPOROSIS, UNSPECIFIED OSTEOPOROSIS TYPE, UNSPECIFIED PATHOLOGICAL FRACTURE PRESENCE: ICD-10-CM

## 2025-02-18 PROCEDURE — 99214 OFFICE O/P EST MOD 30 MIN: CPT | Performed by: PHYSICIAN ASSISTANT

## 2025-02-18 NOTE — PROGRESS NOTES
Patient Progress Note    CC: hypogonadism, osteoporosis     Referring Provider  No referring provider defined for this encounter.     History of Present Illness:     Patient is a 64-year-old male here for follow-up of osteoporosis and hypogonadism.  He was diagnosed with osteoporosis in 2016.  He was initially treated with Forteo for 1.5 years in 2019.  As per prior note patient reported DEXA scan improved to osteopenia.  In 2020 when he used Fosamax for 2 years and then transition to Reclast.  He used Reclast once in 2023.  He has a DEXA scan order in his chart which he can schedule in May 2025 as that is when he will be due. Per Dr. De Oliveira, because his DEXA scan from May 2023 showed stable findings and his bone turnover markers were within normal range, it is ok to wait to repeat DEXA scan until this year and to hold off on treatment until he completes the next scan.  His vitamin D was previously normal at 47.8. He is taking a calcium + vitamin D and another supplement which may have some vitamin D. No recent fractures. He did have a fall last week while riding his mountain bike. He does do weight bearing exercises.   Hypogonadism: His last testosterone was elevated at 1091.  He had a repeat testosterone level ordered which has not been done yet.  He is taking testosterone cypionate 200 mg/mL 0.4 mL weekly.  He is tolerating it well.  Previously LFTs, PSA, CBC were within acceptable range.      Patient Active Problem List   Diagnosis    Osteoporosis    Low testosterone in male    Hypogonadism, male    Vitamin D deficiency     Past Medical History:   Diagnosis Date    Osteoporosis       Past Surgical History:   Procedure Laterality Date    APPENDECTOMY      TONSILLECTOMY  1968      Family History   Problem Relation Age of Onset    Breast cancer Mother     Dementia Father     Diabetes Sister     Breast cancer Sister      Social History     Tobacco Use    Smoking status: Never    Smokeless tobacco: Never  "  Substance Use Topics    Alcohol use: Yes     Comment: Occasionally     Allergies   Allergen Reactions    Ibuprofen Headache     Higher doses over 600 gives him a headache    Naproxen Swelling    Penicillins Rash     Current Outpatient Medications   Medication Sig Dispense Refill    B-D HYPODERMIC NEEDLE 18GX1.5\" 18G X 1-1/2\" MISC Inject weekly 50 each 0    BD Eclipse Syringe 25G X 1\" 3 ML MISC Inject weekly 50 each 0    finasteride (PROPECIA) 1 MG tablet Take 1 tablet (1 mg total) by mouth daily 90 tablet 1    testosterone cypionate (DEPO-TESTOSTERONE) 200 mg/mL SOLN 0.4 ml once weekly 10 mL 1     No current facility-administered medications for this visit.         Review of Systems   Constitutional:  Negative for activity change, appetite change, fatigue and unexpected weight change.   HENT:  Negative for trouble swallowing.    Eyes:  Negative for visual disturbance.   Respiratory:  Negative for shortness of breath.    Cardiovascular:  Negative for chest pain and palpitations.   Gastrointestinal:  Positive for constipation (occasional). Negative for diarrhea.   Endocrine: Negative for polydipsia and polyuria.   Musculoskeletal: Negative.    Skin:  Negative for wound.   Neurological:  Negative for numbness.   Psychiatric/Behavioral: Negative.         Physical Exam:  Body mass index is 23.68 kg/m².  /76   Pulse 63   Ht 5' 10\" (1.778 m)   Wt 74.8 kg (165 lb)   SpO2 99%   BMI 23.68 kg/m²    Wt Readings from Last 3 Encounters:   02/18/25 74.8 kg (165 lb)   10/29/24 73.5 kg (162 lb)   09/27/24 73.9 kg (163 lb)       Physical Exam  Vitals and nursing note reviewed.   Constitutional:       Appearance: He is well-developed.   HENT:      Head: Normocephalic.   Eyes:      General: No scleral icterus.  Neck:      Thyroid: No thyromegaly.   Cardiovascular:      Rate and Rhythm: Normal rate and regular rhythm.      Pulses:           Radial pulses are 2+ on the right side and 2+ on the left side.      Heart sounds: " No murmur heard.  Pulmonary:      Effort: Pulmonary effort is normal. No respiratory distress.      Breath sounds: Normal breath sounds. No wheezing.   Musculoskeletal:      Cervical back: Neck supple.   Skin:     General: Skin is warm and dry.   Neurological:      Mental Status: He is alert.           Labs:   Lab Results   Component Value Date    HGBA1C 5.5 05/18/2024       Lab Results   Component Value Date    HDL 62 05/18/2024    TRIG 60 05/18/2024       Lab Results   Component Value Date    CALCIUM 9.8 08/13/2024    K 3.9 05/18/2024    CO2 28 05/18/2024     05/18/2024    BUN 15 05/18/2024    CREATININE 0.95 05/18/2024        eGFR   Date Value Ref Range Status   05/18/2024 84 ml/min/1.73sq m Final       Lab Results   Component Value Date    ALT 18 05/18/2024    AST 20 05/18/2024    ALKPHOS 32 (L) 05/18/2024       Lab Results   Component Value Date    [id number] 1.429 08/13/2024       Plan:    Diagnoses and all orders for this visit:    Hypogonadism, male  Total testosterone previously elevated at 1728 and free testosterone normal at 1.2  He always had a problem with low free testosterone despite normal total testosterone  Sex hormone binding globulin has been elevated in the past   Continue to monitor testosterone level, LFTs, PSA, hemoglobin and hematocrit  Check labs now and prior to next visit  -     Testosterone, free, total; Future  -     Hemoglobin and hematocrit, blood; Future  -     Hepatic function panel; Future  -     Testosterone, free, total; Future  -     Hemoglobin and hematocrit, blood; Future  -     Comprehensive metabolic panel; Future  -     PSA, Total Screen; Future    Osteoporosis, unspecified osteoporosis type, unspecified pathological fracture presence  Currently on drug holiday  Last treatment was 1 dose of Reclast in 2023  Previously treated with Forteo and Fosamax  He is due for a DEXA scan in May 2025  Continue calcium + vitamin D supplementation  Take precautions to avoid  falls  Do weight bearing exercises as tolerated     Vitamin D deficiency  Vitamin D previously normal at 47.8  Continue current supplementation  -     Vitamin D 25 hydroxy; Future          Discussed with the patient and all questions fully answered. He will call me if any problems arise.    Counseled patient on diagnostic results, prognosis, risk and benefit of treatment options, instruction for management, importance of treatment compliance, risk  factor reduction and impressions      Kendra Elmore PA-C

## 2025-05-30 DIAGNOSIS — L65.9 HAIR LOSS: ICD-10-CM

## 2025-06-01 RX ORDER — FINASTERIDE 1 MG/1
1 TABLET, FILM COATED ORAL DAILY
Qty: 90 TABLET | Refills: 0 | Status: SHIPPED | OUTPATIENT
Start: 2025-06-01

## 2025-06-23 DIAGNOSIS — R79.89 LOW TESTOSTERONE IN MALE: ICD-10-CM

## 2025-06-24 ENCOUNTER — TELEPHONE (OUTPATIENT)
Age: 65
End: 2025-06-24

## 2025-06-24 RX ORDER — TESTOSTERONE CYPIONATE 200 MG/ML
INJECTION, SOLUTION INTRAMUSCULAR
Qty: 6 ML | Refills: 0 | Status: SHIPPED | OUTPATIENT
Start: 2025-06-24

## 2025-06-24 NOTE — TELEPHONE ENCOUNTER
PA for Testosterone cypionate 200mg SUBMITTED to [employer]    via    []CMM-KEY:   []Surescripts-Case ID #   []Availity-Auth ID # NDC #   []Faxed to plan   [x]Other website PromptPA EOC ID [id number]  []Phone call Case ID #     [x]PA sent as URGENT    All office notes, labs and other pertaining documents and studies sent. Clinical questions answered. Awaiting determination from insurance company.     Turnaround time for your insurance to make a decision on your Prior Authorization can take 7-21 business days.

## 2025-06-25 ENCOUNTER — TELEPHONE (OUTPATIENT)
Dept: ENDOCRINOLOGY | Facility: CLINIC | Age: 65
End: 2025-06-25

## 2025-06-25 DIAGNOSIS — E29.1 HYPOGONADISM, MALE: ICD-10-CM

## 2025-06-25 NOTE — TELEPHONE ENCOUNTER
Please see media for form sent from [employer] for patients Testosterone PA    Patient needs to complete his testosterone labs before they can give a determination

## 2025-06-26 NOTE — TELEPHONE ENCOUNTER
Cover my meds Prior auth follow up   Testosterone Cypionate 200MG/ML Solution  Key [id number]  See media

## 2025-06-28 ENCOUNTER — APPOINTMENT (OUTPATIENT)
Dept: LAB | Age: 65
End: 2025-06-28
Attending: PHYSICIAN ASSISTANT
Payer: COMMERCIAL

## 2025-06-28 DIAGNOSIS — E29.1 HYPOGONADISM, MALE: ICD-10-CM

## 2025-06-28 LAB
ALBUMIN SERPL BCG-MCNC: 4.2 G/DL (ref 3.5–5)
ALP SERPL-CCNC: 38 U/L (ref 34–104)
ALT SERPL W P-5'-P-CCNC: 19 U/L (ref 7–52)
ANION GAP SERPL CALCULATED.3IONS-SCNC: 8 MMOL/L (ref 4–13)
AST SERPL W P-5'-P-CCNC: 20 U/L (ref 13–39)
BASOPHILS # BLD AUTO: 0.03 THOUSANDS/ÂΜL (ref 0–0.1)
BASOPHILS NFR BLD AUTO: 1 % (ref 0–1)
BILIRUB SERPL-MCNC: 1.44 MG/DL (ref 0.2–1)
BUN SERPL-MCNC: 16 MG/DL (ref 5–25)
CALCIUM SERPL-MCNC: 8.9 MG/DL (ref 8.4–10.2)
CHLORIDE SERPL-SCNC: 105 MMOL/L (ref 96–108)
CHOLEST SERPL-MCNC: 181 MG/DL (ref ?–200)
CO2 SERPL-SCNC: 28 MMOL/L (ref 21–32)
CREAT SERPL-MCNC: 0.96 MG/DL (ref 0.6–1.3)
EOSINOPHIL # BLD AUTO: 0.07 THOUSAND/ÂΜL (ref 0–0.61)
EOSINOPHIL NFR BLD AUTO: 2 % (ref 0–6)
ERYTHROCYTE [DISTWIDTH] IN BLOOD BY AUTOMATED COUNT: 13 % (ref 11.6–15.1)
EST. AVERAGE GLUCOSE BLD GHB EST-MCNC: 123 MG/DL
GFR SERPL CREATININE-BSD FRML MDRD: 82 ML/MIN/1.73SQ M
GLUCOSE P FAST SERPL-MCNC: 91 MG/DL (ref 65–99)
HBA1C MFR BLD: 5.9 %
HCT VFR BLD AUTO: 45.4 % (ref 36.5–49.3)
HDLC SERPL-MCNC: 66 MG/DL
HGB BLD-MCNC: 15.2 G/DL (ref 12–17)
IMM GRANULOCYTES # BLD AUTO: 0.01 THOUSAND/UL (ref 0–0.2)
IMM GRANULOCYTES NFR BLD AUTO: 0 % (ref 0–2)
LDLC SERPL CALC-MCNC: 107 MG/DL (ref 0–100)
LYMPHOCYTES # BLD AUTO: 1.76 THOUSANDS/ÂΜL (ref 0.6–4.47)
LYMPHOCYTES NFR BLD AUTO: 40 % (ref 14–44)
MCH RBC QN AUTO: 31 PG (ref 26.8–34.3)
MCHC RBC AUTO-ENTMCNC: 33.5 G/DL (ref 31.4–37.4)
MCV RBC AUTO: 93 FL (ref 82–98)
MONOCYTES # BLD AUTO: 0.36 THOUSAND/ÂΜL (ref 0.17–1.22)
MONOCYTES NFR BLD AUTO: 8 % (ref 4–12)
NEUTROPHILS # BLD AUTO: 2.21 THOUSANDS/ÂΜL (ref 1.85–7.62)
NEUTS SEG NFR BLD AUTO: 49 % (ref 43–75)
NONHDLC SERPL-MCNC: 115 MG/DL
NRBC BLD AUTO-RTO: 0 /100 WBCS
PLATELET # BLD AUTO: 202 THOUSANDS/UL (ref 149–390)
PMV BLD AUTO: 10.3 FL (ref 8.9–12.7)
POTASSIUM SERPL-SCNC: 4.1 MMOL/L (ref 3.5–5.3)
PROT SERPL-MCNC: 6.7 G/DL (ref 6.4–8.4)
PSA SERPL-MCNC: 0.33 NG/ML (ref 0–4)
RBC # BLD AUTO: 4.91 MILLION/UL (ref 3.88–5.62)
SODIUM SERPL-SCNC: 141 MMOL/L (ref 135–147)
TRIGL SERPL-MCNC: 38 MG/DL (ref ?–150)
TSH SERPL DL<=0.05 MIU/L-ACNC: 2.62 UIU/ML (ref 0.45–4.5)
WBC # BLD AUTO: 4.44 THOUSAND/UL (ref 4.31–10.16)

## 2025-06-28 PROCEDURE — 36415 COLL VENOUS BLD VENIPUNCTURE: CPT

## 2025-06-28 PROCEDURE — G0103 PSA SCREENING: HCPCS

## 2025-06-28 PROCEDURE — 84402 ASSAY OF FREE TESTOSTERONE: CPT

## 2025-06-28 PROCEDURE — 84403 ASSAY OF TOTAL TESTOSTERONE: CPT

## 2025-06-30 ENCOUNTER — TELEPHONE (OUTPATIENT)
Dept: ENDOCRINOLOGY | Facility: CLINIC | Age: 65
End: 2025-06-30

## 2025-06-30 NOTE — TELEPHONE ENCOUNTER
Duplicate encounter created, please see telephone encounter from 6/25/26 regarding TESTOSTERONE SYPIONATE PA status. Please review patient's chart to see if there is already an encounter regarding the medication in question and to document anything regarding this medication in regards to anything regarding the authorization process etc before creating another encounter Thank You.

## 2025-06-30 NOTE — TELEPHONE ENCOUNTER
PA for Testosterone Cypionate 200mg APPROVED     Date(s) approved 06/27/2025-06/27/2027      Patient advised by      Picked up from pharmacy    [x]MyChart Message  []Phone call   []LMOM  []L/M to call office as no active Communication consent on file  []Unable to leave detailed message as VM not approved on Communication consent       Pharmacy advised by    [x]Fax  []Phone call  []Secure Chat    Specialty Pharmacy    []     Approval letter scanned into Media Yes

## 2025-07-01 LAB
TESTOST FREE SERPL-MCNC: 11.6 PG/ML (ref 6.6–18.1)
TESTOST SERPL-MCNC: 1162 NG/DL (ref 264–916)

## 2025-07-07 ENCOUNTER — HOSPITAL ENCOUNTER (OUTPATIENT)
Dept: RADIOLOGY | Age: 65
Discharge: HOME | End: 2025-07-07
Payer: COMMERCIAL

## 2025-07-07 VITALS — WEIGHT: 160 LBS | BODY MASS INDEX: 22.9 KG/M2 | HEIGHT: 70 IN

## 2025-07-07 DIAGNOSIS — R79.89 LOW TESTOSTERONE IN MALE: ICD-10-CM

## 2025-07-07 DIAGNOSIS — M81.0 OSTEOPOROSIS, UNSPECIFIED OSTEOPOROSIS TYPE, UNSPECIFIED PATHOLOGICAL FRACTURE PRESENCE: ICD-10-CM

## 2025-07-07 PROCEDURE — 77080 DXA BONE DENSITY AXIAL: CPT

## 2025-08-11 ENCOUNTER — PATIENT MESSAGE (OUTPATIENT)
Dept: ENDOCRINOLOGY | Facility: CLINIC | Age: 65
End: 2025-08-11